# Patient Record
Sex: FEMALE | Race: WHITE | Employment: FULL TIME | URBAN - METROPOLITAN AREA
[De-identification: names, ages, dates, MRNs, and addresses within clinical notes are randomized per-mention and may not be internally consistent; named-entity substitution may affect disease eponyms.]

---

## 2021-04-12 ENCOUNTER — OFFICE VISIT (OUTPATIENT)
Dept: FAMILY MEDICINE CLINIC | Facility: CLINIC | Age: 21
End: 2021-04-12
Payer: COMMERCIAL

## 2021-04-12 VITALS
SYSTOLIC BLOOD PRESSURE: 122 MMHG | HEART RATE: 76 BPM | RESPIRATION RATE: 16 BRPM | WEIGHT: 167 LBS | HEIGHT: 66 IN | DIASTOLIC BLOOD PRESSURE: 70 MMHG | OXYGEN SATURATION: 99 % | BODY MASS INDEX: 26.84 KG/M2 | TEMPERATURE: 98 F

## 2021-04-12 DIAGNOSIS — Z82.49 FAMILY HISTORY OF HEART DISEASE IN MALE FAMILY MEMBER BEFORE AGE 55: ICD-10-CM

## 2021-04-12 DIAGNOSIS — F33.41 RECURRENT MAJOR DEPRESSIVE DISORDER, IN PARTIAL REMISSION (HCC): ICD-10-CM

## 2021-04-12 DIAGNOSIS — E28.2 PCOS (POLYCYSTIC OVARIAN SYNDROME): ICD-10-CM

## 2021-04-12 DIAGNOSIS — R51.9 HEADACHE DISORDER: ICD-10-CM

## 2021-04-12 DIAGNOSIS — Z23 NEED FOR HPV VACCINATION: ICD-10-CM

## 2021-04-12 DIAGNOSIS — L70.0 ACNE VULGARIS: ICD-10-CM

## 2021-04-12 DIAGNOSIS — Z00.00 WELL ADULT EXAM: Primary | ICD-10-CM

## 2021-04-12 DIAGNOSIS — I49.8 FLUTTERING HEART: ICD-10-CM

## 2021-04-12 PROCEDURE — 3008F BODY MASS INDEX DOCD: CPT | Performed by: FAMILY MEDICINE

## 2021-04-12 PROCEDURE — 99385 PREV VISIT NEW AGE 18-39: CPT | Performed by: FAMILY MEDICINE

## 2021-04-12 PROCEDURE — 90651 9VHPV VACCINE 2/3 DOSE IM: CPT | Performed by: FAMILY MEDICINE

## 2021-04-12 PROCEDURE — 99203 OFFICE O/P NEW LOW 30 MIN: CPT | Performed by: FAMILY MEDICINE

## 2021-04-12 PROCEDURE — 90471 IMMUNIZATION ADMIN: CPT | Performed by: FAMILY MEDICINE

## 2021-04-12 PROCEDURE — 3078F DIAST BP <80 MM HG: CPT | Performed by: FAMILY MEDICINE

## 2021-04-12 PROCEDURE — 3074F SYST BP LT 130 MM HG: CPT | Performed by: FAMILY MEDICINE

## 2021-04-12 RX ORDER — MINOCYCLINE HYDROCHLORIDE 100 MG/1
100 TABLET ORAL DAILY
Qty: 84 TABLET | Refills: 0 | Status: SHIPPED | OUTPATIENT
Start: 2021-04-12

## 2021-04-12 RX ORDER — DESVENLAFAXINE 50 MG/1
1 TABLET, EXTENDED RELEASE ORAL EVERY MORNING
COMMUNITY
Start: 2020-10-18

## 2021-04-12 RX ORDER — CLINDAMYCIN AND BENZOYL PEROXIDE 10; 50 MG/G; MG/G
1 GEL TOPICAL NIGHTLY
Qty: 50 G | Refills: 0 | Status: SHIPPED | OUTPATIENT
Start: 2021-04-12 | End: 2022-04-07

## 2021-04-12 RX ORDER — CLONAZEPAM 0.5 MG/1
1 TABLET ORAL NIGHTLY
COMMUNITY
Start: 2021-02-16

## 2021-04-12 RX ORDER — QUETIAPINE 25 MG/1
1 TABLET, FILM COATED ORAL 2 TIMES DAILY
COMMUNITY
Start: 2021-02-16

## 2021-04-12 RX ORDER — ALPRAZOLAM 0.25 MG/1
1 TABLET ORAL AS NEEDED
COMMUNITY

## 2021-04-12 RX ORDER — SUMATRIPTAN 50 MG/1
50 TABLET, FILM COATED ORAL EVERY 2 HOUR PRN
Qty: 15 TABLET | Refills: 0 | Status: SHIPPED | OUTPATIENT
Start: 2021-04-12

## 2021-04-12 RX ORDER — DESVENLAFAXINE 25 MG/1
1 TABLET, EXTENDED RELEASE ORAL DAILY
COMMUNITY
Start: 2020-10-23

## 2021-04-12 RX ORDER — DIVALPROEX SODIUM 125 MG/1
1 CAPSULE, COATED PELLETS ORAL 2 TIMES DAILY
COMMUNITY
Start: 2021-04-06

## 2021-04-12 RX ORDER — ACETAMINOPHEN AND CODEINE PHOSPHATE 120; 12 MG/5ML; MG/5ML
SOLUTION ORAL
COMMUNITY

## 2021-04-12 NOTE — PATIENT INSTRUCTIONS
Controlling Adult Acne     Look for water-based, oil-free skin care products. These are less likely to clog your pores. Your acne treatment will work best if you follow your treatment plan. Be patient.  Acne often takes months to improve, not days or we after acne blemishes heal.  · Using sponges, brushes, or other abrasive tools to clean your skin can irritate the skin. Don't use them. · If you use soft sponges or cloths to apply makeup, keep them clean. · Try not to touch your face.   · If possible, do group should talk with their healthcare providers about the need for clinical breast exams (CBE)1  Clinical breast exam every 3 years1    Cervical cancer Women ages 24 and older  Women between ages 24 and 34 should have a Pap test every 3 years; women mikal months after the first dose    Haemophilus influenzae  Type B (HIB)  Women at increased risk for infection should talk with their healthcare provider  1 to 3 doses   Human papillomavirus (HPV)  All women in this age group up to age 32  3 doses; the second this age group  Every visit   1 According to the ACS, women ages 21 to 44 years should have a clinical breast exam (CBE) as part of their routine health exam every 3 years. Breast self-exams are an option for women starting in their 25s.  But the [place]

## 2021-04-12 NOTE — PROGRESS NOTES
Patient presents with:  Headache: had migraines since she was in grade school  Acne: all over body acne- recently diagnosed with PCOS  ER F/U: Went to ER 3 days ago and EKG was normal and was told she had acid reflux  Physical: New patient- pt is aware of before 48.   -paternal:   Smoking: none  Alcohol: none   Drugs: none   Sexual hx: 1 partner   STD hx: recent screening was negaitve. Occupation: vet treceptionist.   Mammogram:no family hx of breast cancer   Colonoscopy:no family hx of colon cancer.    Pa tablet by mouth nightly. • Desvenlafaxine Succinate ER 50 MG Oral Tablet 24 Hr Take 1 tablet by mouth every morning. TAKE WITH THE 25MG TO GET 75MG     • desvenlafaxine succinate ER 25 MG Oral Tablet 24 Hr Take 1 tablet by mouth daily.  TAKE WITH THE 50 (Temporal)   Resp 16   Ht 5' 6\" (1.676 m)   Wt 167 lb (75.8 kg)   LMP 03/15/2021   SpO2 99%   BMI 26.95 kg/m²   Constitutional: Oriented to person, place, and time. No distress. HEENT:  Normocephalic and atraumatic.  Hearing and tympanic membranes normal pustules. Expectations of therapy discussed. Side effects, risks and benefits of medications discussed. Refill process reviewed. Started on medication as below. F/u 6 weeks   - Clindamycin Phos-Benzoyl Perox 1-5 % External Gel;  Apply 1 Application topi AT NIGHTTIME  - QUEtiapine Fumarate 25 MG Oral Tab; Take 1 tablet by mouth 2 (two) times daily. 2 tablets at nighttime    8.  Need for HPV vaccination  Counseled on risks and benefits of hpv vaccine   - HPV HUMAN PAPILLOMA VIRUS VACC 9 ALFREDA 3 DOSE IM    Orde irritate your skin and make acne worse. · Ask your healthcare provider before buying over-the-counter acne treatments. Some of these, such as those with benzoyl peroxide or salicylic acid, can work.  But they often have side effects, such as skin getting t substitute for professional medical care. Always follow your healthcare professional's instructions. Prevention Guidelines, Women Ages 25 to 44  Screening tests and vaccines are an important part of managing your health.  A screening test is done to week.     Gonorrhea Sexually active women at increased risk for infection  At routine exams   Hepatitis C Anyone at increased risk  At routine exams   HIV All women At routine exams3     Obesity All women in this age group  At routine exams   Syphilis Wome increased risk for infection should talk with their healthcare provider  PCV13: 1 dose ages 23 to 72 (protects against 13 types of pneumococcal bacteria)   PPSV23: 1 to 2 doses through age 59, or 1 dose at 72 or older (protects against 23 types of pneumoco content on 10/1/2017  © 3467-5868 [employer]. All rights reserved. This information is not intended as a substitute for professional medical care. Always follow your healthcare professional's instructions.             All questions were answe

## 2021-04-13 PROBLEM — Z82.49 FAMILY HISTORY OF HEART DISEASE IN MALE FAMILY MEMBER BEFORE AGE 55: Status: ACTIVE | Noted: 2021-04-13

## 2021-04-13 PROBLEM — E28.2 PCOS (POLYCYSTIC OVARIAN SYNDROME): Status: ACTIVE | Noted: 2021-04-13

## 2021-04-13 PROBLEM — R51.9 HEADACHE DISORDER: Status: ACTIVE | Noted: 2021-04-13

## 2021-04-13 PROBLEM — I49.8 FLUTTERING HEART: Status: ACTIVE | Noted: 2021-04-13

## 2021-04-13 PROBLEM — L70.0 ACNE VULGARIS: Status: ACTIVE | Noted: 2021-04-13

## 2021-04-29 ENCOUNTER — TELEPHONE (OUTPATIENT)
Dept: FAMILY MEDICINE CLINIC | Facility: CLINIC | Age: 21
End: 2021-04-29

## 2021-04-29 NOTE — TELEPHONE ENCOUNTER
Patient calling, feels symptoms are getting worse. Wanted to update PCP that echo and holter monitor have been scheduled. Holter monitor scheduled for 5-4,echo scheduled for 5-6.  Please advise

## 2021-05-04 ENCOUNTER — HOSPITAL ENCOUNTER (OUTPATIENT)
Dept: CV DIAGNOSTICS | Facility: HOSPITAL | Age: 21
Discharge: HOME | End: 2021-05-04
Attending: FAMILY MEDICINE
Payer: COMMERCIAL

## 2021-05-04 ENCOUNTER — LAB ENCOUNTER (OUTPATIENT)
Dept: LAB | Age: 21
End: 2021-05-04
Attending: FAMILY MEDICINE
Payer: COMMERCIAL

## 2021-05-04 DIAGNOSIS — I49.8 FLUTTERING HEART: ICD-10-CM

## 2021-05-04 DIAGNOSIS — Z82.49 FAMILY HISTORY OF HEART DISEASE IN MALE FAMILY MEMBER BEFORE AGE 55: ICD-10-CM

## 2021-05-04 PROCEDURE — 80061 LIPID PANEL: CPT | Performed by: FAMILY MEDICINE

## 2021-05-04 PROCEDURE — 36415 COLL VENOUS BLD VENIPUNCTURE: CPT | Performed by: FAMILY MEDICINE

## 2021-05-04 PROCEDURE — 84443 ASSAY THYROID STIM HORMONE: CPT | Performed by: FAMILY MEDICINE

## 2021-05-04 PROCEDURE — 93227 XTRNL ECG REC<48 HR R&I: CPT | Performed by: FAMILY MEDICINE

## 2021-05-04 PROCEDURE — 93226 XTRNL ECG REC<48 HR SCAN A/R: CPT | Performed by: FAMILY MEDICINE

## 2021-05-04 PROCEDURE — 93225 XTRNL ECG REC<48 HRS REC: CPT | Performed by: FAMILY MEDICINE

## 2021-05-04 PROCEDURE — 80053 COMPREHEN METABOLIC PANEL: CPT | Performed by: FAMILY MEDICINE

## 2021-05-04 PROCEDURE — 82306 VITAMIN D 25 HYDROXY: CPT | Performed by: FAMILY MEDICINE

## 2021-05-04 PROCEDURE — 85025 COMPLETE CBC W/AUTO DIFF WBC: CPT | Performed by: FAMILY MEDICINE

## 2021-05-06 ENCOUNTER — HOSPITAL ENCOUNTER (OUTPATIENT)
Dept: CV DIAGNOSTICS | Facility: HOSPITAL | Age: 21
Discharge: HOME | End: 2021-05-06
Attending: FAMILY MEDICINE
Payer: COMMERCIAL

## 2021-05-06 DIAGNOSIS — I49.8 FLUTTERING HEART: ICD-10-CM

## 2021-05-06 DIAGNOSIS — Z82.49 FAMILY HISTORY OF HEART DISEASE IN MALE FAMILY MEMBER BEFORE AGE 55: ICD-10-CM

## 2021-05-06 PROCEDURE — 93306 TTE W/DOPPLER COMPLETE: CPT | Performed by: FAMILY MEDICINE
